# Patient Record
Sex: MALE | Race: WHITE | NOT HISPANIC OR LATINO | Employment: OTHER | URBAN - METROPOLITAN AREA
[De-identification: names, ages, dates, MRNs, and addresses within clinical notes are randomized per-mention and may not be internally consistent; named-entity substitution may affect disease eponyms.]

---

## 2018-06-20 ENCOUNTER — HOSPITAL ENCOUNTER (EMERGENCY)
Facility: HOSPITAL | Age: 48
Discharge: HOME | End: 2018-06-20
Attending: EMERGENCY MEDICINE

## 2018-06-20 VITALS
WEIGHT: 154.31 LBS | HEIGHT: 73 IN | DIASTOLIC BLOOD PRESSURE: 89 MMHG | OXYGEN SATURATION: 99 % | TEMPERATURE: 99 F | SYSTOLIC BLOOD PRESSURE: 156 MMHG | RESPIRATION RATE: 21 BRPM | BODY MASS INDEX: 20.45 KG/M2 | HEART RATE: 66 BPM

## 2018-06-20 DIAGNOSIS — Z87.19 HISTORY OF PANCREATITIS: ICD-10-CM

## 2018-06-20 DIAGNOSIS — F14.10 COCAINE ABUSE: ICD-10-CM

## 2018-06-20 DIAGNOSIS — F15.90 AMPHETAMINE USER: ICD-10-CM

## 2018-06-20 DIAGNOSIS — R10.13 EPIGASTRIC ABDOMINAL PAIN: ICD-10-CM

## 2018-06-20 DIAGNOSIS — K86.89 PANCREATIC MASS: Primary | ICD-10-CM

## 2018-06-20 DIAGNOSIS — K86.0 ALCOHOL-INDUCED CHRONIC PANCREATITIS: ICD-10-CM

## 2018-06-20 DIAGNOSIS — R74.8 ELEVATED LIPASE: ICD-10-CM

## 2018-06-20 DIAGNOSIS — Z78.9 ALCOHOL USE: ICD-10-CM

## 2018-06-20 LAB
ALBUMIN SERPL BCP-MCNC: 4 G/DL
ALP SERPL-CCNC: 126 U/L
ALT SERPL W/O P-5'-P-CCNC: 30 U/L
AMPHET+METHAMPHET UR QL: NORMAL
ANION GAP SERPL CALC-SCNC: 12 MMOL/L
AST SERPL-CCNC: 36 U/L
BARBITURATES UR QL SCN>200 NG/ML: NEGATIVE
BASOPHILS # BLD AUTO: 0.01 K/UL
BASOPHILS NFR BLD: 0.1 %
BENZODIAZ UR QL SCN>200 NG/ML: NEGATIVE
BILIRUB SERPL-MCNC: 0.8 MG/DL
BILIRUB UR QL STRIP: NEGATIVE
BUN SERPL-MCNC: 6 MG/DL
BZE UR QL SCN: NORMAL
CALCIUM SERPL-MCNC: 9.5 MG/DL
CANNABINOIDS UR QL SCN: NEGATIVE
CHLORIDE SERPL-SCNC: 103 MMOL/L
CLARITY UR: CLEAR
CO2 SERPL-SCNC: 21 MMOL/L
COLOR UR: YELLOW
CREAT SERPL-MCNC: 0.8 MG/DL
CREAT UR-MCNC: 39.5 MG/DL
DIFFERENTIAL METHOD: ABNORMAL
EOSINOPHIL # BLD AUTO: 0.1 K/UL
EOSINOPHIL NFR BLD: 0.7 %
ERYTHROCYTE [DISTWIDTH] IN BLOOD BY AUTOMATED COUNT: 14.2 %
EST. GFR  (AFRICAN AMERICAN): >60 ML/MIN/1.73 M^2
EST. GFR  (NON AFRICAN AMERICAN): >60 ML/MIN/1.73 M^2
ETHANOL SERPL-MCNC: 41 MG/DL
GLUCOSE SERPL-MCNC: 84 MG/DL
GLUCOSE UR QL STRIP: NEGATIVE
HCT VFR BLD AUTO: 43.1 %
HGB BLD-MCNC: 14.9 G/DL
HGB UR QL STRIP: NEGATIVE
KETONES UR QL STRIP: NEGATIVE
LEUKOCYTE ESTERASE UR QL STRIP: NEGATIVE
LIPASE SERPL-CCNC: 76 U/L
LYMPHOCYTES # BLD AUTO: 1.5 K/UL
LYMPHOCYTES NFR BLD: 14.5 %
MCH RBC QN AUTO: 32.7 PG
MCHC RBC AUTO-ENTMCNC: 34.6 G/DL
MCV RBC AUTO: 95 FL
METHADONE UR QL SCN>300 NG/ML: NEGATIVE
MONOCYTES # BLD AUTO: 0.7 K/UL
MONOCYTES NFR BLD: 6.9 %
NEUTROPHILS # BLD AUTO: 8.3 K/UL
NEUTROPHILS NFR BLD: 77.8 %
NITRITE UR QL STRIP: NEGATIVE
OPIATES UR QL SCN: NEGATIVE
PCP UR QL SCN>25 NG/ML: NEGATIVE
PH UR STRIP: 6 [PH] (ref 5–8)
PLATELET # BLD AUTO: 268 K/UL
PMV BLD AUTO: 9.6 FL
POTASSIUM SERPL-SCNC: 4.1 MMOL/L
PROT SERPL-MCNC: 7.3 G/DL
PROT UR QL STRIP: NEGATIVE
RBC # BLD AUTO: 4.55 M/UL
SODIUM SERPL-SCNC: 136 MMOL/L
SP GR UR STRIP: <=1.005 (ref 1–1.03)
TOXICOLOGY INFORMATION: NORMAL
TROPONIN I SERPL DL<=0.01 NG/ML-MCNC: <0.006 NG/ML
URN SPEC COLLECT METH UR: ABNORMAL
UROBILINOGEN UR STRIP-ACNC: NEGATIVE EU/DL
WBC # BLD AUTO: 10.64 K/UL

## 2018-06-20 PROCEDURE — 25500020 PHARM REV CODE 255: Performed by: EMERGENCY MEDICINE

## 2018-06-20 PROCEDURE — 80053 COMPREHEN METABOLIC PANEL: CPT

## 2018-06-20 PROCEDURE — 80307 DRUG TEST PRSMV CHEM ANLYZR: CPT

## 2018-06-20 PROCEDURE — 96361 HYDRATE IV INFUSION ADD-ON: CPT

## 2018-06-20 PROCEDURE — 96375 TX/PRO/DX INJ NEW DRUG ADDON: CPT

## 2018-06-20 PROCEDURE — 99284 EMERGENCY DEPT VISIT MOD MDM: CPT | Mod: 25

## 2018-06-20 PROCEDURE — 93010 ELECTROCARDIOGRAM REPORT: CPT | Mod: ,,, | Performed by: INTERNAL MEDICINE

## 2018-06-20 PROCEDURE — 85025 COMPLETE CBC W/AUTO DIFF WBC: CPT

## 2018-06-20 PROCEDURE — 25000003 PHARM REV CODE 250: Performed by: EMERGENCY MEDICINE

## 2018-06-20 PROCEDURE — 84484 ASSAY OF TROPONIN QUANT: CPT

## 2018-06-20 PROCEDURE — 63600175 PHARM REV CODE 636 W HCPCS: Performed by: EMERGENCY MEDICINE

## 2018-06-20 PROCEDURE — 96365 THER/PROPH/DIAG IV INF INIT: CPT

## 2018-06-20 PROCEDURE — 93005 ELECTROCARDIOGRAM TRACING: CPT

## 2018-06-20 PROCEDURE — 83690 ASSAY OF LIPASE: CPT

## 2018-06-20 PROCEDURE — 81003 URINALYSIS AUTO W/O SCOPE: CPT | Mod: 59

## 2018-06-20 PROCEDURE — 80320 DRUG SCREEN QUANTALCOHOLS: CPT

## 2018-06-20 RX ORDER — ONDANSETRON 4 MG/1
4 TABLET, FILM COATED ORAL EVERY 8 HOURS PRN
Qty: 12 TABLET | Refills: 0 | Status: SHIPPED | OUTPATIENT
Start: 2018-06-20

## 2018-06-20 RX ORDER — ONDANSETRON 2 MG/ML
4 INJECTION INTRAMUSCULAR; INTRAVENOUS
Status: COMPLETED | OUTPATIENT
Start: 2018-06-20 | End: 2018-06-20

## 2018-06-20 RX ORDER — ACETAMINOPHEN 10 MG/ML
1000 INJECTION, SOLUTION INTRAVENOUS ONCE
Status: COMPLETED | OUTPATIENT
Start: 2018-06-20 | End: 2018-06-20

## 2018-06-20 RX ORDER — MORPHINE SULFATE 4 MG/ML
4 INJECTION, SOLUTION INTRAMUSCULAR; INTRAVENOUS
Status: COMPLETED | OUTPATIENT
Start: 2018-06-20 | End: 2018-06-20

## 2018-06-20 RX ORDER — TRAMADOL HYDROCHLORIDE 50 MG/1
50 TABLET ORAL EVERY 6 HOURS PRN
Qty: 12 TABLET | Refills: 0 | Status: SHIPPED | OUTPATIENT
Start: 2018-06-20 | End: 2018-06-30

## 2018-06-20 RX ADMIN — IOHEXOL 75 ML: 350 INJECTION, SOLUTION INTRAVENOUS at 12:06

## 2018-06-20 RX ADMIN — SODIUM CHLORIDE 1000 ML: 0.9 INJECTION, SOLUTION INTRAVENOUS at 10:06

## 2018-06-20 RX ADMIN — ONDANSETRON 4 MG: 2 INJECTION INTRAMUSCULAR; INTRAVENOUS at 10:06

## 2018-06-20 RX ADMIN — MORPHINE SULFATE 4 MG: 4 INJECTION INTRAVENOUS at 10:06

## 2018-06-20 RX ADMIN — ACETAMINOPHEN 1000 MG: 10 INJECTION, SOLUTION INTRAVENOUS at 10:06

## 2018-06-20 NOTE — DISCHARGE INSTRUCTIONS
Refrain from using drugs or alcohol.  Follow up with PCP/GI for reevaluation of pancreatic mass and chronic pancreatitis.  Return to ER if symptoms persist or worsen.

## 2018-06-20 NOTE — ED NOTES
Patient states he lives in [facility] and states he is down here staying at a hotel for work, states he has no idea what meds he takes and has been out for a month or longer on his meds

## 2018-06-20 NOTE — ED PROVIDER NOTES
"SCRIBE #1 NOTE: I, Lou Jailene, am scribing for, and in the presence of, Neri Cole Jr., MD. I have scribed the entire note.      History      Chief Complaint   Patient presents with    Abdominal Pain     " Hx of pancreatic cancer and pancreatitis"       Review of patient's allergies indicates:   Allergen Reactions    Flexeril [cyclobenzaprine]      Restless leg    Opioids - morphine analogues      "makes me crazy"    Pcn [penicillins] Hives and Rash        HPI   HPI    6/20/2018, 9:57 AM   History obtained from the patient      History of Present Illness: Venu Tovar is a 47 y.o. male patient with a PMHx of pancreatitis who presents to the Emergency Department for epigastric abd pain which onset gradually today. Symptoms are constant and moderate in severity. Pt reports sxs are similar to past pancreatic pain. Pt reports having a few beers last night. No mitigating or exacerbating factors reported. Pt is also complaining of cough with blood-tinged sputum which onset 2 days ago. Pt denies seeing any blood in sputum yesterday or today. Patient denies any fever, chills, constipation, hematochezia, dysuria, hematuria, urinary frequency, n/v/d, SOB, CP, leg swelling, and all other sxs at this time. Pt is a current smoker (1pack/day). No further complaints or concerns at this time.         Arrival mode: Personal vehicle     PCP: Reynaldo Saldivar MD       Past Medical History:  Past Medical History:   Diagnosis Date    Hypertension     Pancreatitis        Past Surgical History:  History reviewed. No pertinent surgical history.      Family History:  History reviewed. No pertinent family history.    Social History:  Social History     Social History Main Topics    Smoking status: Current Every Day Smoker    Smokeless tobacco: Unknown    Alcohol use Yes      Comment: socially     Drug use: Unknown    Sexual activity: Unknown       ROS   Review of Systems   Constitutional: Negative for chills and fever. "   HENT: Negative for sore throat.    Respiratory: Positive for cough. Negative for shortness of breath.    Cardiovascular: Negative for chest pain and leg swelling.   Gastrointestinal: Positive for abdominal pain. Negative for blood in stool, constipation, diarrhea, nausea and vomiting.   Genitourinary: Negative for dysuria, frequency and hematuria.   Musculoskeletal: Negative for back pain.   Skin: Negative for rash.   Neurological: Negative for weakness.   Hematological: Does not bruise/bleed easily.   All other systems reviewed and are negative.      Physical Exam      Initial Vitals [06/20/18 0944]   BP Pulse Resp Temp SpO2   (!) 159/108 99 (!) 22 98.9 °F (37.2 °C) 97 %      MAP       --          Physical Exam  Nursing Notes and Vital Signs Reviewed.  Constitutional: Patient is in no acute distress. Well-developed and well-nourished.  Head: Atraumatic. Normocephalic.  Eyes: PERRL. EOM intact. Conjunctivae are not pale. No scleral icterus.  ENT: Mucous membranes are moist. Oropharynx is clear and symmetric.    Neck: Supple. Full ROM. No lymphadenopathy.  Cardiovascular: Regular rate. Regular rhythm. No murmurs, rubs, or gallops. Distal pulses are 2+ and symmetric.  Pulmonary/Chest: No respiratory distress. Clear to auscultation bilaterally. No wheezing or rales.  Abdominal: Soft and non-distended.  There is epigastric abd tenderness.  No rebound, guarding, or rigidity.   Genitourinary: No CVA tenderness  Musculoskeletal: Moves all extremities. No obvious deformities. No edema.  Skin: Warm and dry.  Neurological:  Alert, awake, and appropriate.  Normal speech.  No acute focal neurological deficits are appreciated.  Psychiatric: Normal affect. Good eye contact. Appropriate in content.    ED Course    Procedures  ED Vital Signs:  Vitals:    06/20/18 0944 06/20/18 1044 06/20/18 1102 06/20/18 1240   BP: (!) 159/108 139/83 (!) 146/93 (!) 162/91   Pulse: 99 73 70 72   Resp: (!) 22 12 17 16   Temp: 98.9 °F (37.2 °C) 98.7  "°F (37.1 °C)     TempSrc: Oral      SpO2: 97% 100% 99% 98%   Weight: 70 kg (154 lb 5.2 oz)      Height: 6' 1" (1.854 m)       06/20/18 1400   BP: (!) 156/89   Pulse: 66   Resp: (!) 21   Temp:    TempSrc:    SpO2: 99%   Weight:    Height:        Abnormal Lab Results:  Labs Reviewed   CBC W/ AUTO DIFFERENTIAL - Abnormal; Notable for the following:        Result Value    RBC 4.55 (*)     MCH 32.7 (*)     Gran # (ANC) 8.3 (*)     Gran% 77.8 (*)     Lymph% 14.5 (*)     All other components within normal limits   COMPREHENSIVE METABOLIC PANEL - Abnormal; Notable for the following:     CO2 21 (*)     All other components within normal limits   LIPASE - Abnormal; Notable for the following:     Lipase 76 (*)     All other components within normal limits   URINALYSIS - Abnormal; Notable for the following:     Specific Gravity, UA <=1.005 (*)     All other components within normal limits   ALCOHOL,MEDICAL (ETHANOL) - Abnormal; Notable for the following:     Alcohol, Medical, Serum 41 (*)     All other components within normal limits   TROPONIN I   DRUG SCREEN PANEL, URINE EMERGENCY        All Lab Results:  Results for orders placed or performed during the hospital encounter of 06/20/18   CBC W/ AUTO DIFFERENTIAL   Result Value Ref Range    WBC 10.64 3.90 - 12.70 K/uL    RBC 4.55 (L) 4.60 - 6.20 M/uL    Hemoglobin 14.9 14.0 - 18.0 g/dL    Hematocrit 43.1 40.0 - 54.0 %    MCV 95 82 - 98 fL    MCH 32.7 (H) 27.0 - 31.0 pg    MCHC 34.6 32.0 - 36.0 g/dL    RDW 14.2 11.5 - 14.5 %    Platelets 268 150 - 350 K/uL    MPV 9.6 9.2 - 12.9 fL    Gran # (ANC) 8.3 (H) 1.8 - 7.7 K/uL    Lymph # 1.5 1.0 - 4.8 K/uL    Mono # 0.7 0.3 - 1.0 K/uL    Eos # 0.1 0.0 - 0.5 K/uL    Baso # 0.01 0.00 - 0.20 K/uL    Gran% 77.8 (H) 38.0 - 73.0 %    Lymph% 14.5 (L) 18.0 - 48.0 %    Mono% 6.9 4.0 - 15.0 %    Eosinophil% 0.7 0.0 - 8.0 %    Basophil% 0.1 0.0 - 1.9 %    Differential Method Automated    Comp. Metabolic Panel   Result Value Ref Range    Sodium 136 " 136 - 145 mmol/L    Potassium 4.1 3.5 - 5.1 mmol/L    Chloride 103 95 - 110 mmol/L    CO2 21 (L) 23 - 29 mmol/L    Glucose 84 70 - 110 mg/dL    BUN, Bld 6 6 - 20 mg/dL    Creatinine 0.8 0.5 - 1.4 mg/dL    Calcium 9.5 8.7 - 10.5 mg/dL    Total Protein 7.3 6.0 - 8.4 g/dL    Albumin 4.0 3.5 - 5.2 g/dL    Total Bilirubin 0.8 0.1 - 1.0 mg/dL    Alkaline Phosphatase 126 55 - 135 U/L    AST 36 10 - 40 U/L    ALT 30 10 - 44 U/L    Anion Gap 12 8 - 16 mmol/L    eGFR if African American >60 >60 mL/min/1.73 m^2    eGFR if non African American >60 >60 mL/min/1.73 m^2   Lipase   Result Value Ref Range    Lipase 76 (H) 4 - 60 U/L   Urinalysis - Clean Catch   Result Value Ref Range    Specimen UA Urine, Clean Catch     Color, UA Yellow Yellow, Straw, Nancy    Appearance, UA Clear Clear    pH, UA 6.0 5.0 - 8.0    Specific Gravity, UA <=1.005 (A) 1.005 - 1.030    Protein, UA Negative Negative    Glucose, UA Negative Negative    Ketones, UA Negative Negative    Bilirubin (UA) Negative Negative    Occult Blood UA Negative Negative    Nitrite, UA Negative Negative    Urobilinogen, UA Negative <2.0 EU/dL    Leukocytes, UA Negative Negative   Troponin I   Result Value Ref Range    Troponin I <0.006 0.000 - 0.026 ng/mL   Drug screen panel, emergency   Result Value Ref Range    Benzodiazepines Negative     Methadone metabolites Negative     Cocaine (Metab.) Presumptive Positive     Opiate Scrn, Ur Negative     Barbiturate Screen, Ur Negative     Amphetamine Screen, Ur Presumptive Positive     THC Negative     Phencyclidine Negative     Creatinine, Random Ur 39.5 23.0 - 375.0 mg/dL    Toxicology Information SEE COMMENT    Ethanol   Result Value Ref Range    Alcohol, Medical, Serum 41 (H) <10 mg/dL         Imaging Results:  Imaging Results          CT Abdomen Pelvis With Contrast (Final result)  Result time 06/20/18 12:46:14    Final result by Kip Mendes MD (06/20/18 12:46:14)                 Impression:      Ill-defined masses seen  the at the level the head of the pancreas measuring approximately 4 x 2.3 cm.  There is moderate dilatation of the common bile duct measuring 9 mm and pancreatic duct measuring 4 mm.  Recommend ERCP/EUS versus MRCP for further evaluation.    Mild fluid-filled loops of small bowel are seen within the epigastric region with mild hyperemia which can be seen in the setting of enteritis.    All CT scans at this facility use dose modulation, iterative reconstruction, and/or weight based dosing when appropriate to reduce radiation dose to as low as reasonable achievable.      Electronically signed by: Kip Mendes MD  Date:    06/20/2018  Time:    12:46             Narrative:    EXAMINATION:  CT ABDOMEN PELVIS WITH CONTRAST    CLINICAL HISTORY:  epigastric abd pain, hx panacreatitis;    TECHNIQUE:  Low dose axial images, sagittal and coronal reformations were obtained from the lung bases to the pubic symphysis following the IV administration of 75 mL of Omnipaque 350.    COMPARISON:  None    FINDINGS:  Heart: Normal size. No effusion.    Lung Bases: Clear.    Liver: Normal size and attenuation. No focal lesions.  Portal vein is patent.    Gallbladder: Not significantly distended.  No calcified gallstones.    Bile Ducts: Mild intrahepatic common bile duct dilatation.    Pancreas: Ill-defined masses seen the at the level the head of the pancreas measuring approximately 4 x 2.3 cm.  There is moderate dilatation of the common bile duct measuring 9 mm and pancreatic duct measuring 4 mm.    Spleen: Normal.    Adrenals: Normal.    Kidneys/Ureters: Normal enhancement.  No mass or  hydroureteronephrosis.    Bladder: Mild bladder distension    Reproductive organs: Normal.    GI Tract/Mesentery: Mild fluid-filled loops of small bowel are seen within the epigastric region with mild hyperemia which can be seen in the setting of enteritis.  No evidence of bowel obstruction .  Normal appendix.  Mild to moderate  constipation.    Peritoneal Space: No ascites or free air.    Retroperitoneum: No significant adenopathy.    Abdominal wall: Normal.    Vasculature: No aneurysm. Aorta demonstrates atherosclerotic disease.    Bones: Mild DJD.  Mild endplate degenerative changes are seen at T12 and L2.  No acute fracture. No suspicious lytic or sclerotic lesions.                               The EKG was ordered, reviewed, and independently interpreted by the ED provider.  Interpretation time: 1030  Rate: 77 BPM  Rhythm: normal sinus rhythm  Interpretation: No acute ST changes. No STEMI.             The Emergency Provider reviewed the vital signs and test results, which are outlined above.    ED Discussion     1:19 PM: Reassessed pt at this time.  Pt is awake, alert, and in NAD at this time. Discussed with pt all pertinent ED information and results. Discussed pt dx and plan of tx. Gave pt all f/u and return to the ED instructions. All questions and concerns were addressed at this time. Pt expresses understanding of information and instructions, and is comfortable with plan to discharge. Pt is stable for discharge.    I discussed with patient and/or family/caretaker that evaluation in the ED does not suggest any emergent or life threatening medical conditions requiring immediate intervention beyond what was provided in the ED, and I believe patient is safe for discharge.  Regardless, an unremarkable evaluation in the ED does not preclude the development or presence of a serious of life threatening condition. As such, patient was instructed to return immediately for any worsening or change in current symptoms.    Regarding ABDOMINAL PAIN, I recommended that the patient: Sip water or other clear fluids; avoid solid food for the first few hours after vomiting or diarrhea; if vomiting, wait 6 hours, and then eat small amounts of mild foods such as rice, applesauce, or crackers; avoid dairy products; avoid citrus, high-fat foods, fried or  greasy foods, tomato products, caffeine, alcohol, and carbonated beverages;  avoid aspirin, ibuprofen or other anti-inflammatory medications, and narcotic pain medications unless prescribed.  In regards to prevention, I encouraged patient to:  Avoid fatty or greasy foods; drink plenty of water each day; eat small meals more frequently; exercise regularly; limit foods that produce gas; make sure meals are well-balanced and high in fiber and include plenty of fruits and vegetables.    Driving or other activities under influence of medications - Patient and/or family/caretaker was given a prescription for, or instructed to use a medicine that may impair ability to drive, operate machinery, or participate in other potentially dangerous activities.  Patient was instructed not to participate in these activities while under the influence of these medications.    ED Medication(s):  Medications   sodium chloride 0.9% bolus 1,000 mL (0 mLs Intravenous Stopped 6/20/18 1141)   ondansetron injection 4 mg (4 mg Intravenous Given 6/20/18 1042)   acetaminophen (10 mg/mL) injection 1,000 mg (0 mg Intravenous Stopped 6/20/18 1100)   morphine injection 4 mg (4 mg Intravenous Given 6/20/18 1043)   omnipaque 350 iohexol 75 mL (75 mLs Intravenous Given 6/20/18 1214)       Discharge Medication List as of 6/20/2018  1:26 PM      START taking these medications    Details   ondansetron (ZOFRAN) 4 MG tablet Take 1 tablet (4 mg total) by mouth every 8 (eight) hours as needed., Starting Wed 6/20/2018, Print      traMADol (ULTRAM) 50 mg tablet Take 1 tablet (50 mg total) by mouth every 6 (six) hours as needed for Pain., Starting Wed 6/20/2018, Until Sat 6/30/2018, Print             Follow-up Information     Reynaldo Saldivar MD. Schedule an appointment as soon as possible for a visit in 1 week.    Specialty:  Family Medicine  Contact information:  [address] [facility] [place] [place] [address]             [facility] - Gastroenterology. Schedule an  appointment as soon as possible for a visit in 1 week.    Specialty:  Gastroenterology  Contact information:  [address]  [facility] [address] [phone]  Additional information:  (off [facility] 3rd floor           [facility]    Specialty:  Emergency Medicine  Why:  As needed, If symptoms worsen  Contact information:  [address]  [facility] [address] [phone]                   Medical Decision Making    Medical Decision Making:   Clinical Tests:   Lab Tests: Ordered and Reviewed  Radiological Study: Ordered and Reviewed  Medical Tests: Ordered and Reviewed     Additional MDM:   Smoking Cessation: The patient is a smoker. The patient was counseled on smoking cessation for: 5 minutes. The patient was counseled on tobacco related  health complications.        Scribe Attestation:   Scribe #1: I performed the above scribed service and the documentation accurately describes the services I performed. I attest to the accuracy of the note.    Attending:   Physician Attestation Statement for Scribe #1: I, Neri Cole Jr., MD, personally performed the services described in this documentation, as scribed by Lou Dent, in my presence, and it is both accurate and complete.          Clinical Impression       ICD-10-CM ICD-9-CM   1. Pancreatic mass K86.9 577.9   2. Epigastric abdominal pain R10.13 789.06   3. Alcohol use Z78.9 V49.89   4. Cocaine abuse F14.10 305.60   5. 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 Elevated lipase R74.8 790.5   7. History of pancreatitis Z87.19 V12.79   8. Alcohol-induced chronic pancreatitis K86.0 577.1       Disposition:   Disposition: Discharged  Condition: Stable         Neri Cole Jr., MD  06/23/18 0050